# Patient Record
Sex: MALE | Race: WHITE | NOT HISPANIC OR LATINO | Employment: OTHER | URBAN - METROPOLITAN AREA
[De-identification: names, ages, dates, MRNs, and addresses within clinical notes are randomized per-mention and may not be internally consistent; named-entity substitution may affect disease eponyms.]

---

## 2023-11-02 ENCOUNTER — LAB (OUTPATIENT)
Dept: LAB | Facility: LAB | Age: 68
End: 2023-11-02
Payer: MEDICARE

## 2023-11-02 DIAGNOSIS — E78.5 HYPERLIPIDEMIA, UNSPECIFIED HYPERLIPIDEMIA TYPE: ICD-10-CM

## 2023-11-02 DIAGNOSIS — R35.1 NOCTURIA: ICD-10-CM

## 2023-11-02 DIAGNOSIS — M10.9 GOUT, UNSPECIFIED CAUSE, UNSPECIFIED CHRONICITY, UNSPECIFIED SITE: ICD-10-CM

## 2023-11-02 DIAGNOSIS — I10 HTN (HYPERTENSION), BENIGN: ICD-10-CM

## 2023-11-02 DIAGNOSIS — I10 HTN (HYPERTENSION), BENIGN: Primary | ICD-10-CM

## 2023-11-02 PROCEDURE — 36415 COLL VENOUS BLD VENIPUNCTURE: CPT

## 2023-11-02 PROCEDURE — 80061 LIPID PANEL: CPT

## 2023-11-02 PROCEDURE — 80053 COMPREHEN METABOLIC PANEL: CPT

## 2023-11-02 PROCEDURE — 84153 ASSAY OF PSA TOTAL: CPT

## 2023-11-02 PROCEDURE — 84550 ASSAY OF BLOOD/URIC ACID: CPT

## 2023-11-02 RX ORDER — CLOPIDOGREL BISULFATE 75 MG/1
75 TABLET ORAL DAILY
COMMUNITY
End: 2024-05-21 | Stop reason: SDUPTHER

## 2023-11-02 RX ORDER — ATENOLOL 25 MG/1
1 TABLET ORAL DAILY
COMMUNITY
Start: 2021-02-10 | End: 2023-11-13 | Stop reason: SDUPTHER

## 2023-11-02 RX ORDER — ATORVASTATIN CALCIUM 40 MG/1
40 TABLET, FILM COATED ORAL DAILY
Qty: 30 TABLET | Refills: 11 | Status: SHIPPED | OUTPATIENT
Start: 2023-11-02 | End: 2024-11-01

## 2023-11-02 RX ORDER — ATORVASTATIN CALCIUM 40 MG/1
40 TABLET, FILM COATED ORAL DAILY
COMMUNITY
End: 2023-11-02 | Stop reason: SDUPTHER

## 2023-11-02 RX ORDER — HYDROCODONE BITARTRATE AND ACETAMINOPHEN 5; 300 MG/1; MG/1
1 TABLET ORAL
COMMUNITY
Start: 2022-11-10 | End: 2023-11-13 | Stop reason: SDUPTHER

## 2023-11-02 RX ORDER — ALLOPURINOL 300 MG/1
300 TABLET ORAL
COMMUNITY
Start: 2020-12-11 | End: 2023-11-13 | Stop reason: SDUPTHER

## 2023-11-03 LAB
ALBUMIN SERPL BCP-MCNC: 4.6 G/DL (ref 3.4–5)
ALP SERPL-CCNC: 68 U/L (ref 33–136)
ALT SERPL W P-5'-P-CCNC: 23 U/L (ref 10–52)
ANION GAP SERPL CALC-SCNC: 14 MMOL/L (ref 10–20)
AST SERPL W P-5'-P-CCNC: 20 U/L (ref 9–39)
BILIRUB SERPL-MCNC: 0.8 MG/DL (ref 0–1.2)
BUN SERPL-MCNC: 18 MG/DL (ref 6–23)
CALCIUM SERPL-MCNC: 9.4 MG/DL (ref 8.6–10.6)
CHLORIDE SERPL-SCNC: 109 MMOL/L (ref 98–107)
CHOLEST SERPL-MCNC: 132 MG/DL (ref 0–199)
CHOLESTEROL/HDL RATIO: 2.6
CO2 SERPL-SCNC: 24 MMOL/L (ref 21–32)
CREAT SERPL-MCNC: 0.78 MG/DL (ref 0.5–1.3)
GFR SERPL CREATININE-BSD FRML MDRD: >90 ML/MIN/1.73M*2
GLUCOSE SERPL-MCNC: 97 MG/DL (ref 74–99)
HDLC SERPL-MCNC: 50.1 MG/DL
LDLC SERPL CALC-MCNC: 60 MG/DL
NON HDL CHOLESTEROL: 82 MG/DL (ref 0–149)
POTASSIUM SERPL-SCNC: 4 MMOL/L (ref 3.5–5.3)
PROT SERPL-MCNC: 7.2 G/DL (ref 6.4–8.2)
PSA SERPL-MCNC: 0.61 NG/ML
SODIUM SERPL-SCNC: 143 MMOL/L (ref 136–145)
TRIGL SERPL-MCNC: 110 MG/DL (ref 0–149)
URATE SERPL-MCNC: 5.1 MG/DL (ref 4–7.5)
VLDL: 22 MG/DL (ref 0–40)

## 2023-11-07 PROBLEM — I71.20 THORACIC AORTIC ANEURYSM WITHOUT RUPTURE (CMS-HCC): Chronic | Status: ACTIVE | Noted: 2023-06-29

## 2023-11-07 PROBLEM — K21.9 GERD (GASTROESOPHAGEAL REFLUX DISEASE): Status: ACTIVE | Noted: 2023-11-07

## 2023-11-07 PROBLEM — E78.00 HYPERCHOLESTEROLEMIA: Status: ACTIVE | Noted: 2023-11-07

## 2023-11-07 PROBLEM — I10 BENIGN ESSENTIAL HTN: Status: ACTIVE | Noted: 2023-11-07

## 2023-11-07 PROBLEM — K40.90 RIGHT INGUINAL HERNIA: Status: ACTIVE | Noted: 2023-11-07

## 2023-11-07 PROBLEM — I25.10 CAD (CORONARY ARTERY DISEASE): Chronic | Status: ACTIVE | Noted: 2023-11-07

## 2023-11-07 PROBLEM — I25.10 CAD (CORONARY ARTERY DISEASE): Status: ACTIVE | Noted: 2023-11-07

## 2023-11-07 PROBLEM — I71.20 THORACIC AORTIC ANEURYSM WITHOUT RUPTURE (CMS-HCC): Status: ACTIVE | Noted: 2023-06-29

## 2023-11-07 PROBLEM — E66.9 OBESITY (BMI 30-39.9): Status: ACTIVE | Noted: 2023-11-07

## 2023-11-07 PROBLEM — R73.09 ELEVATED GLUCOSE: Status: ACTIVE | Noted: 2023-11-07

## 2023-11-07 PROBLEM — E78.00 HYPERCHOLESTEROLEMIA: Chronic | Status: ACTIVE | Noted: 2023-11-07

## 2023-11-07 PROBLEM — M19.90 ARTHRITIS: Status: ACTIVE | Noted: 2023-11-07

## 2023-11-08 PROBLEM — K40.90 RIGHT INGUINAL HERNIA: Status: RESOLVED | Noted: 2023-11-07 | Resolved: 2023-11-08

## 2023-11-08 PROBLEM — R73.09 ELEVATED GLUCOSE: Status: RESOLVED | Noted: 2023-11-07 | Resolved: 2023-11-08

## 2023-11-08 PROBLEM — M19.90 ARTHRITIS: Status: RESOLVED | Noted: 2023-11-07 | Resolved: 2023-11-08

## 2023-11-08 PROBLEM — I10 BENIGN ESSENTIAL HTN: Chronic | Status: ACTIVE | Noted: 2023-11-07

## 2023-11-09 ENCOUNTER — OFFICE VISIT (OUTPATIENT)
Dept: CARDIOLOGY | Facility: CLINIC | Age: 68
End: 2023-11-09
Payer: MEDICARE

## 2023-11-09 VITALS
OXYGEN SATURATION: 98 % | HEIGHT: 73 IN | WEIGHT: 224 LBS | BODY MASS INDEX: 29.69 KG/M2 | DIASTOLIC BLOOD PRESSURE: 73 MMHG | SYSTOLIC BLOOD PRESSURE: 116 MMHG

## 2023-11-09 DIAGNOSIS — I71.21 ANEURYSM OF ASCENDING AORTA WITHOUT RUPTURE (CMS-HCC): Chronic | ICD-10-CM

## 2023-11-09 DIAGNOSIS — I10 BENIGN ESSENTIAL HTN: Primary | Chronic | ICD-10-CM

## 2023-11-09 DIAGNOSIS — I25.10 CORONARY ARTERY DISEASE INVOLVING NATIVE CORONARY ARTERY OF NATIVE HEART WITHOUT ANGINA PECTORIS: Chronic | ICD-10-CM

## 2023-11-09 DIAGNOSIS — G47.33 OBSTRUCTIVE SLEEP APNEA (ADULT) (PEDIATRIC): Chronic | ICD-10-CM

## 2023-11-09 DIAGNOSIS — E78.00 HYPERCHOLESTEROLEMIA: Chronic | ICD-10-CM

## 2023-11-09 PROBLEM — Z87.19 S/P LAPAROSCOPIC HERNIA REPAIR: Status: ACTIVE | Noted: 2023-11-09

## 2023-11-09 PROBLEM — Z98.890 S/P LAPAROSCOPIC HERNIA REPAIR: Status: ACTIVE | Noted: 2023-11-09

## 2023-11-09 PROCEDURE — 99214 OFFICE O/P EST MOD 30 MIN: CPT | Performed by: INTERNAL MEDICINE

## 2023-11-09 PROCEDURE — 3078F DIAST BP <80 MM HG: CPT | Performed by: INTERNAL MEDICINE

## 2023-11-09 PROCEDURE — 1160F RVW MEDS BY RX/DR IN RCRD: CPT | Performed by: INTERNAL MEDICINE

## 2023-11-09 PROCEDURE — 3074F SYST BP LT 130 MM HG: CPT | Performed by: INTERNAL MEDICINE

## 2023-11-09 PROCEDURE — 93005 ELECTROCARDIOGRAM TRACING: CPT | Mod: PO | Performed by: INTERNAL MEDICINE

## 2023-11-09 PROCEDURE — 1159F MED LIST DOCD IN RCRD: CPT | Performed by: INTERNAL MEDICINE

## 2023-11-09 PROCEDURE — 1036F TOBACCO NON-USER: CPT | Performed by: INTERNAL MEDICINE

## 2023-11-09 PROCEDURE — 99214 OFFICE O/P EST MOD 30 MIN: CPT | Mod: PO | Performed by: INTERNAL MEDICINE

## 2023-11-09 RX ORDER — LANOLIN ALCOHOL/MO/W.PET/CERES
500 CREAM (GRAM) TOPICAL DAILY
COMMUNITY

## 2023-11-09 RX ORDER — ESOMEPRAZOLE MAGNESIUM 40 MG/1
40 CAPSULE, DELAYED RELEASE ORAL
COMMUNITY

## 2023-11-09 RX ORDER — CHOLECALCIFEROL (VITAMIN D3) 25 MCG
1000 TABLET ORAL DAILY
COMMUNITY

## 2023-11-09 NOTE — PATIENT INSTRUCTIONS
1. CAD. Low risk stress test inferoposterior wall ischemia. Catheterization revealed an 80% circumflex marginal branch which was successfully stented. He had a sequential 80% lesion in the RCA and PDA which was staged. He has been doing great.  Continue with aspirin daily.  No symptoms of angina.  EKG today.    2. Hyperlipidemia. Baseline . On atorvastatin 40 mg his 11/2/23 LDL 60, HDL 50 Trig 110.  He believes he can get these results back down to an LDL of less than 50.     3. Thoracic aortic aneurysm. 4.1 cm on a formal CTA. A repeat CTA chest will be done sometime in November 2023.  This has not yet been done.  Will be done and they will call 1 week later to review the results    4. Hypertension well-controlled     EKG today.  CTA chest sometime in the next couple of months.  They will call 1 week afterwards to review the results.  He will return to see me 6 months.

## 2023-11-13 ENCOUNTER — ANCILLARY PROCEDURE (OUTPATIENT)
Dept: RADIOLOGY | Facility: CLINIC | Age: 68
End: 2023-11-13
Payer: MEDICARE

## 2023-11-13 ENCOUNTER — OFFICE VISIT (OUTPATIENT)
Dept: PRIMARY CARE | Facility: CLINIC | Age: 68
End: 2023-11-13
Payer: MEDICARE

## 2023-11-13 VITALS
BODY MASS INDEX: 30.22 KG/M2 | HEIGHT: 73 IN | OXYGEN SATURATION: 96 % | HEART RATE: 49 BPM | WEIGHT: 228 LBS | DIASTOLIC BLOOD PRESSURE: 85 MMHG | SYSTOLIC BLOOD PRESSURE: 135 MMHG

## 2023-11-13 DIAGNOSIS — I10 BENIGN ESSENTIAL HTN: Primary | Chronic | ICD-10-CM

## 2023-11-13 DIAGNOSIS — I25.10 CORONARY ARTERY DISEASE INVOLVING NATIVE CORONARY ARTERY OF NATIVE HEART WITHOUT ANGINA PECTORIS: Chronic | ICD-10-CM

## 2023-11-13 DIAGNOSIS — Z12.11 ENCOUNTER FOR SCREENING FOR MALIGNANT NEOPLASM OF COLON: ICD-10-CM

## 2023-11-13 DIAGNOSIS — M54.50 ACUTE BILATERAL LOW BACK PAIN WITHOUT SCIATICA: ICD-10-CM

## 2023-11-13 DIAGNOSIS — I71.20 THORACIC AORTIC ANEURYSM, WITHOUT RUPTURE, UNSPECIFIED (CMS-HCC): ICD-10-CM

## 2023-11-13 PROCEDURE — 3079F DIAST BP 80-89 MM HG: CPT | Performed by: FAMILY MEDICINE

## 2023-11-13 PROCEDURE — 99213 OFFICE O/P EST LOW 20 MIN: CPT | Performed by: FAMILY MEDICINE

## 2023-11-13 PROCEDURE — 1170F FXNL STATUS ASSESSED: CPT | Performed by: FAMILY MEDICINE

## 2023-11-13 PROCEDURE — 1159F MED LIST DOCD IN RCRD: CPT | Performed by: FAMILY MEDICINE

## 2023-11-13 PROCEDURE — G0444 DEPRESSION SCREEN ANNUAL: HCPCS | Performed by: FAMILY MEDICINE

## 2023-11-13 PROCEDURE — 1036F TOBACCO NON-USER: CPT | Performed by: FAMILY MEDICINE

## 2023-11-13 PROCEDURE — 71275 CT ANGIOGRAPHY CHEST: CPT | Performed by: INTERNAL MEDICINE

## 2023-11-13 PROCEDURE — 3075F SYST BP GE 130 - 139MM HG: CPT | Performed by: FAMILY MEDICINE

## 2023-11-13 PROCEDURE — 1160F RVW MEDS BY RX/DR IN RCRD: CPT | Performed by: FAMILY MEDICINE

## 2023-11-13 PROCEDURE — 71275 CT ANGIOGRAPHY CHEST: CPT

## 2023-11-13 PROCEDURE — G0439 PPPS, SUBSEQ VISIT: HCPCS | Performed by: FAMILY MEDICINE

## 2023-11-13 PROCEDURE — 2550000001 HC RX 255 CONTRASTS: Performed by: INTERNAL MEDICINE

## 2023-11-13 RX ORDER — ATENOLOL 25 MG/1
25 TABLET ORAL DAILY
Qty: 90 TABLET | Refills: 2 | Status: SHIPPED | OUTPATIENT
Start: 2023-11-13

## 2023-11-13 RX ORDER — ALLOPURINOL 300 MG/1
300 TABLET ORAL
Qty: 90 TABLET | Refills: 2 | Status: SHIPPED | OUTPATIENT
Start: 2023-11-13

## 2023-11-13 RX ORDER — HYDROCODONE BITARTRATE AND ACETAMINOPHEN 5; 300 MG/1; MG/1
1 TABLET ORAL EVERY 6 HOURS PRN
Qty: 30 TABLET | Refills: 0 | Status: SHIPPED | OUTPATIENT
Start: 2023-11-13

## 2023-11-13 RX ADMIN — IOHEXOL 75 ML: 350 INJECTION, SOLUTION INTRAVENOUS at 12:14

## 2023-11-13 ASSESSMENT — ENCOUNTER SYMPTOMS
CONSTITUTIONAL NEGATIVE: 1
CARDIOVASCULAR NEGATIVE: 1
RESPIRATORY NEGATIVE: 1
NEUROLOGICAL NEGATIVE: 1
MUSCULOSKELETAL NEGATIVE: 1
GASTROINTESTINAL NEGATIVE: 1

## 2023-11-13 ASSESSMENT — ACTIVITIES OF DAILY LIVING (ADL)
GROCERY_SHOPPING: INDEPENDENT
TAKING_MEDICATION: INDEPENDENT
BATHING: INDEPENDENT
DOING_HOUSEWORK: INDEPENDENT
DRESSING: INDEPENDENT
MANAGING_FINANCES: INDEPENDENT

## 2023-11-13 ASSESSMENT — PATIENT HEALTH QUESTIONNAIRE - PHQ9
SUM OF ALL RESPONSES TO PHQ9 QUESTIONS 1 AND 2: 0
1. LITTLE INTEREST OR PLEASURE IN DOING THINGS: NOT AT ALL
2. FEELING DOWN, DEPRESSED OR HOPELESS: NOT AT ALL

## 2023-11-13 NOTE — PROGRESS NOTES
Subjective   Patient ID: Devante Denise is a 68 y.o. male who presents for Medicare Annual Wellness Visit Subsequent.  HPI  Patient with a history of hypertension and coronary artery disease.  Review of Systems   Constitutional: Negative.    HENT: Negative.     Respiratory: Negative.     Cardiovascular: Negative.    Gastrointestinal: Negative.    Genitourinary: Negative.    Musculoskeletal: Negative.    Neurological: Negative.        Objective   Physical Exam  Constitutional:       Appearance: Normal appearance.   HENT:      Head: Normocephalic and atraumatic.      Nose: Nose normal.      Mouth/Throat:      Mouth: Mucous membranes are moist.   Eyes:      Extraocular Movements: Extraocular movements intact.      Pupils: Pupils are equal, round, and reactive to light.   Cardiovascular:      Rate and Rhythm: Normal rate and regular rhythm.   Pulmonary:      Effort: Pulmonary effort is normal.      Breath sounds: Normal breath sounds.   Musculoskeletal:         General: Normal range of motion.      Cervical back: Normal range of motion.   Skin:     General: Skin is warm and dry.   Neurological:      Mental Status: He is alert.   Rectal exam shows slightly enlarged prostate    Assessment/Plan

## 2023-11-20 LAB
ATRIAL RATE: 47 BPM
P AXIS: 57 DEGREES
P OFFSET: 204 MS
P ONSET: 142 MS
PR INTERVAL: 160 MS
Q ONSET: 222 MS
QRS COUNT: 8 BEATS
QRS DURATION: 96 MS
QT INTERVAL: 456 MS
QTC CALCULATION(BAZETT): 403 MS
QTC FREDERICIA: 420 MS
R AXIS: 56 DEGREES
T AXIS: 56 DEGREES
T OFFSET: 450 MS
VENTRICULAR RATE: 47 BPM

## 2023-11-27 ENCOUNTER — TELEPHONE (OUTPATIENT)
Dept: CARDIOLOGY | Facility: CLINIC | Age: 68
End: 2023-11-27
Payer: MEDICARE

## 2024-05-09 ENCOUNTER — APPOINTMENT (OUTPATIENT)
Dept: CARDIOLOGY | Facility: CLINIC | Age: 69
End: 2024-05-09
Payer: MEDICARE

## 2024-05-09 NOTE — PROGRESS NOTES
Referred by No ref. provider found    HPI Feeling well.  Remains physically active without difficulty.    Past Medical History:  Problem List Items Addressed This Visit    None       Past Medical History:   Diagnosis Date    Benign essential HTN 11/07/2023    CAD (coronary artery disease) 11/07/2023    Elevated calcium score 1416 Agatston units  Cath 3/2022 LAD 50-60% neg FFR, CX 85% s/p TAHIR, RCA large 80% prox, diffuse prox to mid RCA stenosis    Hypercholesterolemia 11/07/2023    Dr. Mas will follow    Obstructive sleep apnea (adult) (pediatric) 10/06/2016    Formatting of this note might be different from the original. 10/5/16 PSG with an AHI of 36.6, obstructive events and associated with occasional oxyhemoglobin desaturations  CPAP 8 cm H2O. Respironics Ana View full face mask (medium size) #  BMI 35.5 kg/m2; ESS 2 out of 24    Other specified health status     No pertinent past surgical history    Personal history of other diseases of the musculoskeletal system and connective tissue     History of gout    Thoracic aortic aneurysm without rupture (CMS-[facility]) 06/29/2023    4.1 cm on a calcium scoring CT scan             Past Surgical History:  He has a past surgical history that includes Other surgical history (11/09/2021); Other surgical history (06/20/2022); Other surgical history (03/29/2022); and Other surgical history (03/29/2022).      Social History:  He reports that he has never smoked. He has never used smokeless tobacco. No history on file for alcohol use and drug use.    Family History:  Family History   Problem Relation Name Age of Onset    Cancer Mother      Coronary artery disease Father      Heart attack Father      Cancer Father          Allergies:  Patient has no known allergies.    Outpatient Medications:  Current Outpatient Medications   Medication Instructions    allopurinol (ZYLOPRIM) 300 mg, oral, Daily RT    ascorbic acid (VITAMIN C) 500 mg, oral, Daily    atenolol (TENORMIN) 25 mg,  "oral, Daily    atorvastatin (LIPITOR) 40 mg, oral, Daily    cholecalciferol (VITAMIN D3) 1,000 Units, oral, Daily    clopidogrel (PLAVIX) 75 mg, oral, Daily    esomeprazole (NEXIUM) 40 mg, oral, Do not open capsule.    HYDROcodone-acetaminophen (Vicodin) 5-300 mg tablet 1 tablet, oral, Every 6 hours PRN        Last Recorded Vitals:  There were no vitals filed for this visit.    Physical Exam    Physical  Patient is alert and oriented x3.  HEENT is unremarkable mucous members are moist  Neck no JVP no bruits upstrokes are full no thyromegaly  Lungs are clear bilaterally.  No wheezing crackles or rales  Heart regular rhythm normal S1-S2 there is no S3 no murmurs are heard.  Abdomen is soft vessels are positive nontender nondistended no organomegaly no pulsatile masses  Extremities have no edema.  Distal pulses present palpable.  Neuro is grossly nonfocal  Skin has no rashes     Last Labs:  CBC -  Lab Results   Component Value Date    WBC 6.1 05/09/2022    HGB 14.8 05/09/2022    HCT 43.8 05/09/2022    MCV 94 05/09/2022     05/09/2022       CMP -  Lab Results   Component Value Date    CALCIUM 9.4 11/02/2023    PHOS 3.8 05/09/2022    PROT 7.2 11/02/2023    ALBUMIN 4.6 11/02/2023    AST 20 11/02/2023    ALT 23 11/02/2023    ALKPHOS 68 11/02/2023    BILITOT 0.8 11/02/2023       LIPID PANEL -   Lab Results   Component Value Date    CHOL 132 11/02/2023    HDL 50.1 11/02/2023    CHHDL 2.6 11/02/2023    VLDL 22 11/02/2023    TRIG 110 11/02/2023    NHDL 82 11/02/2023       RENAL FUNCTION PANEL -   Lab Results   Component Value Date    K 4.0 11/02/2023    PHOS 3.8 05/09/2022       No results found for: \"BNP\", \"HGBA1C\"  Procedure    CTA chest 11/13/2023 TAA 4.2 cm coronary calcification    PCI [05/12/2022, Dr. Harmony Reid]: Successful IVUS guided PCI to RCA using two TAHIR     PCI [03/24/2022, Dr. Harmony Reid]: Non-obstructive LAD stenosis by hemodynamic evaluation. Successful IVUS guided TAHIR " placement to mid LCX.      Left Heart Cath [03/24/2022, Dr. Juaquin Mas]: Severe multi-vessel disease including an 85% AV groove circumflex lesion, 75% proximal RCA followed by moderate to severe diffuse disease in the entire mid to distal portion of the RCA, and moderate disease in the LAD.     CTA [02/25/2022]: Ascending thoracic aortic aneurysmal dilatation to 4.1 cm. Sig coronary artery calcifications. LLL solid pulm nodule measuring (4 mm).      EX NST [02/25/2022]: 8 min 5 sec (10.10 METs) . . . Prob normal â€“ some degree diaphragmatic att artifact. Subtle degree ischemia involving only basilar inferoposterior wall cannot be excluded but less likely in view of normal contractility. EF 56%.     CT / SCORING [01/24/2022] = 1415.8 (LM 98.5, LAD 60, LCx 400.9, .4). 5mm posterolateral L lung nodule, likely benign. Aneurysmal dilatation ascending thoracic aorta @ 4.6 cm.         Assessment/Plan   1. CAD. Low risk stress test inferoposterior wall ischemia. Catheterization revealed an 80% circumflex marginal branch which was successfully stented. He had a sequential 80% lesion in the RCA and PDA which was staged. He has been doing great.  Continue with aspirin daily.  No symptoms of angina.  EKG today.    2. Hyperlipidemia. Baseline . On atorvastatin 40 mg his 11/2/23 LDL 60, HDL 50 Trig 110.  He believes he can get these results back down to an LDL of less than 50.     3. Thoracic aortic aneurysm. 4.1 cm on a formal CTA.  11/13/2023 4.2 cm  4. Hypertension well-controlled       Juaquin Mas MD     Instructions and follow up

## 2024-05-21 DIAGNOSIS — I10 BENIGN ESSENTIAL HTN: Primary | Chronic | ICD-10-CM

## 2024-05-21 RX ORDER — CLOPIDOGREL BISULFATE 75 MG/1
75 TABLET ORAL DAILY
Qty: 90 TABLET | Refills: 3 | Status: SHIPPED | OUTPATIENT
Start: 2024-05-21

## 2024-08-11 DIAGNOSIS — I10 BENIGN ESSENTIAL HTN: Chronic | ICD-10-CM

## 2024-08-13 RX ORDER — ATENOLOL 25 MG/1
25 TABLET ORAL DAILY
Qty: 90 TABLET | Refills: 2 | Status: SHIPPED | OUTPATIENT
Start: 2024-08-13

## 2024-08-13 RX ORDER — ALLOPURINOL 300 MG/1
300 TABLET ORAL DAILY
Qty: 90 TABLET | Refills: 2 | Status: SHIPPED | OUTPATIENT
Start: 2024-08-13

## 2024-09-05 ENCOUNTER — TELEPHONE (OUTPATIENT)
Dept: CARDIOLOGY | Facility: CLINIC | Age: 69
End: 2024-09-05
Payer: MEDICARE

## 2024-09-05 DIAGNOSIS — I25.10 CORONARY ARTERY DISEASE INVOLVING NATIVE CORONARY ARTERY OF NATIVE HEART WITHOUT ANGINA PECTORIS: ICD-10-CM

## 2024-09-05 NOTE — TELEPHONE ENCOUNTER
Pt's spouse asking if pt can get blood work drawn prior to appt 9/13? Last lipid/CMP drawn 11/2/23.

## 2024-09-10 ENCOUNTER — LAB (OUTPATIENT)
Dept: LAB | Facility: LAB | Age: 69
End: 2024-09-10
Payer: MEDICARE

## 2024-09-10 DIAGNOSIS — I25.10 CORONARY ARTERY DISEASE INVOLVING NATIVE CORONARY ARTERY OF NATIVE HEART WITHOUT ANGINA PECTORIS: ICD-10-CM

## 2024-09-10 LAB
ALBUMIN SERPL BCP-MCNC: 5.1 G/DL (ref 3.4–5)
ALP SERPL-CCNC: 63 U/L (ref 33–136)
ALT SERPL W P-5'-P-CCNC: 25 U/L (ref 10–52)
ANION GAP SERPL CALC-SCNC: 16 MMOL/L (ref 10–20)
AST SERPL W P-5'-P-CCNC: 24 U/L (ref 9–39)
BILIRUB SERPL-MCNC: 1.1 MG/DL (ref 0–1.2)
BUN SERPL-MCNC: 18 MG/DL (ref 6–23)
CALCIUM SERPL-MCNC: 10 MG/DL (ref 8.6–10.6)
CHLORIDE SERPL-SCNC: 104 MMOL/L (ref 98–107)
CHOLEST SERPL-MCNC: 141 MG/DL (ref 0–199)
CHOLESTEROL/HDL RATIO: 2.6
CO2 SERPL-SCNC: 24 MMOL/L (ref 21–32)
CREAT SERPL-MCNC: 0.9 MG/DL (ref 0.5–1.3)
EGFRCR SERPLBLD CKD-EPI 2021: >90 ML/MIN/1.73M*2
GLUCOSE SERPL-MCNC: 104 MG/DL (ref 74–99)
HDLC SERPL-MCNC: 54.4 MG/DL
LDLC SERPL CALC-MCNC: 63 MG/DL
NON HDL CHOLESTEROL: 87 MG/DL (ref 0–149)
POTASSIUM SERPL-SCNC: 3.9 MMOL/L (ref 3.5–5.3)
PROT SERPL-MCNC: 7.5 G/DL (ref 6.4–8.2)
SODIUM SERPL-SCNC: 140 MMOL/L (ref 136–145)
TRIGL SERPL-MCNC: 117 MG/DL (ref 0–149)
VLDL: 23 MG/DL (ref 0–40)

## 2024-09-10 PROCEDURE — 80053 COMPREHEN METABOLIC PANEL: CPT

## 2024-09-10 PROCEDURE — 36415 COLL VENOUS BLD VENIPUNCTURE: CPT

## 2024-09-10 PROCEDURE — 80061 LIPID PANEL: CPT

## 2024-09-13 ENCOUNTER — OFFICE VISIT (OUTPATIENT)
Dept: CARDIOLOGY | Facility: CLINIC | Age: 69
End: 2024-09-13
Payer: MEDICARE

## 2024-09-13 VITALS
BODY MASS INDEX: 29.63 KG/M2 | HEART RATE: 58 BPM | WEIGHT: 223.6 LBS | HEIGHT: 73 IN | OXYGEN SATURATION: 95 % | SYSTOLIC BLOOD PRESSURE: 128 MMHG | DIASTOLIC BLOOD PRESSURE: 82 MMHG

## 2024-09-13 DIAGNOSIS — I10 BENIGN ESSENTIAL HTN: Chronic | ICD-10-CM

## 2024-09-13 DIAGNOSIS — E78.00 HYPERCHOLESTEROLEMIA: Chronic | ICD-10-CM

## 2024-09-13 DIAGNOSIS — I10 BENIGN ESSENTIAL HYPERTENSION: Primary | Chronic | ICD-10-CM

## 2024-09-13 DIAGNOSIS — I25.10 CORONARY ARTERY DISEASE INVOLVING NATIVE CORONARY ARTERY OF NATIVE HEART WITHOUT ANGINA PECTORIS: Chronic | ICD-10-CM

## 2024-09-13 DIAGNOSIS — E78.5 HYPERLIPIDEMIA, UNSPECIFIED HYPERLIPIDEMIA TYPE: ICD-10-CM

## 2024-09-13 PROCEDURE — 1036F TOBACCO NON-USER: CPT | Performed by: INTERNAL MEDICINE

## 2024-09-13 PROCEDURE — 3079F DIAST BP 80-89 MM HG: CPT | Performed by: INTERNAL MEDICINE

## 2024-09-13 PROCEDURE — 3074F SYST BP LT 130 MM HG: CPT | Performed by: INTERNAL MEDICINE

## 2024-09-13 PROCEDURE — 3008F BODY MASS INDEX DOCD: CPT | Performed by: INTERNAL MEDICINE

## 2024-09-13 PROCEDURE — 93005 ELECTROCARDIOGRAM TRACING: CPT | Performed by: INTERNAL MEDICINE

## 2024-09-13 PROCEDURE — 1160F RVW MEDS BY RX/DR IN RCRD: CPT | Performed by: INTERNAL MEDICINE

## 2024-09-13 PROCEDURE — 99214 OFFICE O/P EST MOD 30 MIN: CPT | Performed by: INTERNAL MEDICINE

## 2024-09-13 PROCEDURE — 1159F MED LIST DOCD IN RCRD: CPT | Performed by: INTERNAL MEDICINE

## 2024-09-13 RX ORDER — ATORVASTATIN CALCIUM 40 MG/1
40 TABLET, FILM COATED ORAL DAILY
Qty: 90 TABLET | Refills: 3 | Status: SHIPPED | OUTPATIENT
Start: 2024-09-13 | End: 2025-09-13

## 2024-09-13 RX ORDER — CLOPIDOGREL BISULFATE 75 MG/1
75 TABLET ORAL DAILY
Qty: 90 TABLET | Refills: 3 | Status: SHIPPED | OUTPATIENT
Start: 2024-09-13

## 2024-09-13 RX ORDER — ATENOLOL 25 MG/1
25 TABLET ORAL DAILY
Qty: 90 TABLET | Refills: 3 | Status: SHIPPED | OUTPATIENT
Start: 2024-09-13

## 2024-09-13 ASSESSMENT — ENCOUNTER SYMPTOMS
DEPRESSION: 0
OCCASIONAL FEELINGS OF UNSTEADINESS: 0
LOSS OF SENSATION IN FEET: 0

## 2024-09-13 ASSESSMENT — PATIENT HEALTH QUESTIONNAIRE - PHQ9
1. LITTLE INTEREST OR PLEASURE IN DOING THINGS: NOT AT ALL
2. FEELING DOWN, DEPRESSED OR HOPELESS: NOT AT ALL
SUM OF ALL RESPONSES TO PHQ9 QUESTIONS 1 AND 2: 0

## 2024-09-13 ASSESSMENT — COLUMBIA-SUICIDE SEVERITY RATING SCALE - C-SSRS
2. HAVE YOU ACTUALLY HAD ANY THOUGHTS OF KILLING YOURSELF?: NO
1. IN THE PAST MONTH, HAVE YOU WISHED YOU WERE DEAD OR WISHED YOU COULD GO TO SLEEP AND NOT WAKE UP?: NO
6. HAVE YOU EVER DONE ANYTHING, STARTED TO DO ANYTHING, OR PREPARED TO DO ANYTHING TO END YOUR LIFE?: NO

## 2024-09-13 NOTE — PATIENT INSTRUCTIONS
1. CAD. Low risk stress test inferoposterior wall ischemia. Catheterization revealed an 80% circumflex marginal branch which was successfully stented. He had a sequential 80% lesion in the RCA and PDA which was staged. He has been doing great.  Continue with aspirin daily.  No symptoms of angina.  EKG today.    2. Hyperlipidemia. Baseline . On atorvastatin 40 mg.  9/10/2024 LDL 63 HDL 54 triglycerides 117 LFTs normal blood sugar 104     3. Thoracic aortic aneurysm. 4.1 cm on a formal CTA 2022..  The CT was repeated 11/13/2023 the aneurysm stable at 4.2 cm. Repeat in 9/2025.     4. Hypertension well-controlled.     CTA 9/2025, FBW 9/2025, RTC 1 year.  EKG

## 2024-09-13 NOTE — PROGRESS NOTES
Referred by No ref. provider found    HPI Feeling well.  Remains physically active without difficulty. No CP/SOB    Past Medical History:  Problem List Items Addressed This Visit    None     Past Medical History:   Diagnosis Date    Benign essential HTN 11/07/2023    CAD (coronary artery disease) 11/07/2023    Elevated calcium score 1416 Agatston units  Cath 3/2022 LAD 50-60% neg FFR, CX 85% s/p TAHIR, RCA large 80% prox, diffuse prox to mid RCA stenosis    Hypercholesterolemia 11/07/2023    Dr. Mas will follow    Obstructive sleep apnea (adult) (pediatric) 10/06/2016    Formatting of this note might be different from the original. 10/5/16 PSG with an AHI of 36.6, obstructive events and associated with occasional oxyhemoglobin desaturations  CPAP 8 cm H2O. [employer] Ana View full face mask (medium size) #  BMI 35.5 kg/m2; ESS 2 out of 24    Other specified health status     No pertinent past surgical history    Personal history of other diseases of the musculoskeletal system and connective tissue     History of gout    Thoracic aortic aneurysm without rupture (CMS-[facility]) 06/29/2023    4.1 cm on a calcium scoring CT scan      Past Surgical History:  He has a past surgical history that includes Other surgical history (11/09/2021); Other surgical history (06/20/2022); Other surgical history (03/29/2022); and Other surgical history (03/29/2022).      Social History:  He reports that he has never smoked. He has never used smokeless tobacco. No history on file for alcohol use and drug use.    Family History:  Family History   Problem Relation Name Age of Onset    Cancer Mother      Coronary artery disease Father      Heart attack Father      Cancer Father       Allergies:  Patient has no known allergies.    Outpatient Medications:  Current Outpatient Medications   Medication Instructions    allopurinol (ZYLOPRIM) 300 mg, oral, Daily    ascorbic acid (VITAMIN C) 500 mg, oral, Daily    atenolol (TENORMIN) 25 mg,  "oral, Daily    atorvastatin (LIPITOR) 40 mg, oral, Daily    cholecalciferol (VITAMIN D3) 1,000 Units, oral, Daily    clopidogrel (PLAVIX) 75 mg, oral, Daily    esomeprazole (NEXIUM) 40 mg, oral, Do not open capsule.    HYDROcodone-acetaminophen (Vicodin) 5-300 mg tablet 1 tablet, oral, Every 6 hours PRN     Last Recorded Vitals:  There were no vitals filed for this visit.    Physical Exam  Patient is alert and oriented x3.  HEENT is unremarkable mucous members are moist  Neck no JVP no bruits upstrokes are full no thyromegaly  Lungs are clear bilaterally.  No wheezing crackles or rales  Heart regular rhythm normal S1-S2 there is no S3 no murmurs are heard.  Abdomen is soft bs are positive nontender nondistended no organomegaly no pulsatile masses  Extremities have no edema.  Distal pulses present palpable.  Neuro is grossly nonfocal  Skin has no rashes     Last Labs:  CBC -  Lab Results   Component Value Date    WBC 6.1 05/09/2022    HGB 14.8 05/09/2022    HCT 43.8 05/09/2022    MCV 94 05/09/2022     05/09/2022     CMP -  Lab Results   Component Value Date    CALCIUM 10.0 09/10/2024    PHOS 3.8 05/09/2022    PROT 7.5 09/10/2024    ALBUMIN 5.1 (H) 09/10/2024    AST 24 09/10/2024    ALT 25 09/10/2024    ALKPHOS 63 09/10/2024    BILITOT 1.1 09/10/2024     LIPID PANEL -   Lab Results   Component Value Date    CHOL 141 09/10/2024    HDL 54.4 09/10/2024    CHHDL 2.6 09/10/2024    VLDL 23 09/10/2024    TRIG 117 09/10/2024    NHDL 87 09/10/2024     RENAL FUNCTION PANEL -   Lab Results   Component Value Date    K 3.9 09/10/2024    PHOS 3.8 05/09/2022     No results found for: \"BNP\", \"HGBA1C\"  Procedure    CTA chest 11/13/2023 stable TAA 4.2 cm, diffuse coronary calcification    PCI [05/12/2022, Dr. Harmony Reid]: Successful IVUS guided PCI to RCA using two TAHIR     PCI [03/24/2022, Dr. Harmony Reid]: Non-obstructive LAD stenosis by hemodynamic evaluation. Successful IVUS guided TAHIR placement to " mid LCX.      Left Heart Cath [03/24/2022, Dr. Juaquin Mas]: Severe multi-vessel disease including an 85% AV groove circumflex lesion, 75% proximal RCA followed by moderate to severe diffuse disease in the entire mid to distal portion of the RCA, and moderate disease in the LAD.     CTA [02/25/2022]: Ascending thoracic aortic aneurysmal dilatation to 4.1 cm. Sig coronary artery calcifications. LLL solid pulm nodule measuring (4 mm).      EX NST [02/25/2022]: 8 min 5 sec (10.10 METs) . . . Prob normal â€“ some degree diaphragmatic att artifact. Subtle degree ischemia involving only basilar inferoposterior wall cannot be excluded but less likely in view of normal contractility. EF 56%.     CT / SCORING [01/24/2022] = 1415.8 (LM 98.5, LAD 60, LCx 400.9, .4). 5mm posterolateral L lung nodule, likely benign. Aneurysmal dilatation ascending thoracic aorta @ 4.6 cm.       Assessment/Plan   1. CAD. Low risk stress test inferoposterior wall ischemia. Catheterization revealed an 80% circumflex marginal branch which was successfully stented. He had a sequential 80% lesion in the RCA and PDA which was staged. He has been doing great.  Continue with aspirin daily.  No symptoms of angina.  EKG today.    2. Hyperlipidemia. Baseline . On atorvastatin 40 mg.  9/10/2024 LDL 63 HDL 54 triglycerides 117 LFTs normal blood sugar 104     3. Thoracic aortic aneurysm. 4.1 cm on a formal CTA 2022..  The CT was repeated 11/13/2023 the aneurysm stable at 4.2 cm. Repeat in 9/2025.     4. Hypertension well-controlled.     CTA 9/2025, FBW 9/2025, RTC 1 year.  EKG    Juaquin Mas MD     Instructions and follow up

## 2024-09-17 LAB
ATRIAL RATE: 46 BPM
P AXIS: 36 DEGREES
P OFFSET: 200 MS
P ONSET: 143 MS
PR INTERVAL: 158 MS
Q ONSET: 222 MS
QRS COUNT: 8 BEATS
QRS DURATION: 94 MS
QT INTERVAL: 432 MS
QTC CALCULATION(BAZETT): 378 MS
QTC FREDERICIA: 395 MS
R AXIS: 4 DEGREES
T AXIS: 24 DEGREES
T OFFSET: 438 MS
VENTRICULAR RATE: 46 BPM

## 2024-11-19 ENCOUNTER — APPOINTMENT (OUTPATIENT)
Dept: PRIMARY CARE | Facility: CLINIC | Age: 69
End: 2024-11-19
Payer: MEDICARE

## 2024-11-19 VITALS
HEIGHT: 73 IN | SYSTOLIC BLOOD PRESSURE: 125 MMHG | OXYGEN SATURATION: 96 % | WEIGHT: 226 LBS | BODY MASS INDEX: 29.95 KG/M2 | DIASTOLIC BLOOD PRESSURE: 80 MMHG | HEART RATE: 52 BPM

## 2024-11-19 DIAGNOSIS — M54.50 ACUTE BILATERAL LOW BACK PAIN WITHOUT SCIATICA: ICD-10-CM

## 2024-11-19 DIAGNOSIS — Z00.00 ROUTINE GENERAL MEDICAL EXAMINATION AT HEALTH CARE FACILITY: Primary | ICD-10-CM

## 2024-11-19 DIAGNOSIS — R35.1 NOCTURIA: ICD-10-CM

## 2024-11-19 PROCEDURE — G0439 PPPS, SUBSEQ VISIT: HCPCS | Performed by: FAMILY MEDICINE

## 2024-11-19 PROCEDURE — 3074F SYST BP LT 130 MM HG: CPT | Performed by: FAMILY MEDICINE

## 2024-11-19 PROCEDURE — 99213 OFFICE O/P EST LOW 20 MIN: CPT | Performed by: FAMILY MEDICINE

## 2024-11-19 PROCEDURE — 3008F BODY MASS INDEX DOCD: CPT | Performed by: FAMILY MEDICINE

## 2024-11-19 PROCEDURE — 3079F DIAST BP 80-89 MM HG: CPT | Performed by: FAMILY MEDICINE

## 2024-11-19 PROCEDURE — 99497 ADVNCD CARE PLAN 30 MIN: CPT | Performed by: FAMILY MEDICINE

## 2024-11-19 PROCEDURE — 1124F ACP DISCUSS-NO DSCNMKR DOCD: CPT | Performed by: FAMILY MEDICINE

## 2024-11-19 PROCEDURE — G0444 DEPRESSION SCREEN ANNUAL: HCPCS | Performed by: FAMILY MEDICINE

## 2024-11-19 PROCEDURE — 90677 PCV20 VACCINE IM: CPT | Performed by: FAMILY MEDICINE

## 2024-11-19 PROCEDURE — 1170F FXNL STATUS ASSESSED: CPT | Performed by: FAMILY MEDICINE

## 2024-11-19 PROCEDURE — G0009 ADMIN PNEUMOCOCCAL VACCINE: HCPCS | Performed by: FAMILY MEDICINE

## 2024-11-19 RX ORDER — HYDROCODONE BITARTRATE AND ACETAMINOPHEN 5; 300 MG/1; MG/1
1 TABLET ORAL EVERY 6 HOURS PRN
Qty: 30 TABLET | Refills: 0 | Status: SHIPPED | OUTPATIENT
Start: 2024-11-19

## 2024-11-19 ASSESSMENT — PATIENT HEALTH QUESTIONNAIRE - PHQ9
SUM OF ALL RESPONSES TO PHQ9 QUESTIONS 1 AND 2: 0
2. FEELING DOWN, DEPRESSED OR HOPELESS: NOT AT ALL
1. LITTLE INTEREST OR PLEASURE IN DOING THINGS: NOT AT ALL

## 2024-11-19 ASSESSMENT — ACTIVITIES OF DAILY LIVING (ADL)
DOING_HOUSEWORK: INDEPENDENT
DRESSING: INDEPENDENT
GROCERY_SHOPPING: INDEPENDENT
TAKING_MEDICATION: INDEPENDENT
BATHING: INDEPENDENT
MANAGING_FINANCES: INDEPENDENT

## 2024-11-19 ASSESSMENT — ENCOUNTER SYMPTOMS
RESPIRATORY NEGATIVE: 1
CONSTITUTIONAL NEGATIVE: 1
CARDIOVASCULAR NEGATIVE: 1

## 2024-11-19 NOTE — PROGRESS NOTES
"Subjective   Reason for Visit: Devante Denise is an 69 y.o. male here for a Medicare Wellness visit.   Discussed depression screen with patient for 5 min   Discussed living will and DNR with patient and spent 16 min doing so. Pts questions and concerns reviewed.      Chronic pain back         HPI    Patient Care Team:  Brandt Saleh DO as PCP - General (Family Medicine)  Brandt Saleh DO as PCP - [facility] Attributed Provider     Review of Systems   Constitutional: Negative.    HENT: Negative.     Respiratory: Negative.     Cardiovascular: Negative.    Genitourinary: Negative.        Objective   Vitals:  /80   Pulse 52   Ht 1.854 m (6' 1\")   Wt 103 kg (226 lb)   SpO2 96%   BMI 29.82 kg/m²       Physical Exam  Constitutional:       Appearance: Normal appearance.   HENT:      Head: Normocephalic.      Mouth/Throat:      Mouth: Mucous membranes are moist.   Eyes:      Extraocular Movements: Extraocular movements intact.      Pupils: Pupils are equal, round, and reactive to light.   Cardiovascular:      Rate and Rhythm: Normal rate and regular rhythm.   Pulmonary:      Effort: Pulmonary effort is normal.   Abdominal:      General: Abdomen is flat.   Musculoskeletal:         General: Normal range of motion.   Neurological:      Mental Status: He is alert.       Assessment & Plan  Routine general medical examination at health care facility    Orders:  •  1 Year Follow Up In Primary Care - Wellness Exam; Future    Nocturia    Orders:  •  PSA; Future    Acute bilateral low back pain without sciatica                   "

## 2025-05-08 DIAGNOSIS — I10 BENIGN ESSENTIAL HTN: Chronic | ICD-10-CM

## 2025-05-08 RX ORDER — ALLOPURINOL 300 MG/1
300 TABLET ORAL DAILY
Qty: 90 TABLET | Refills: 2 | Status: SHIPPED | OUTPATIENT
Start: 2025-05-08

## 2025-06-13 ENCOUNTER — TELEPHONE (OUTPATIENT)
Dept: CARDIOLOGY | Facility: CLINIC | Age: 70
End: 2025-06-13
Payer: MEDICARE

## 2025-06-13 NOTE — TELEPHONE ENCOUNTER
Pt will be scheduled for Colonoscopy.     Clearance is requested.     Can plavix be held for 5 days?    Next Dr Tg VERDUGO scheduled for 9/11/24    Last Dr Tg VERDUGO 9/13/24  1. CAD. Low risk stress test inferoposterior wall ischemia. Catheterization revealed an 80% circumflex marginal branch which was successfully stented. He had a sequential 80% lesion in the RCA and PDA which was staged. He has been doing great.  Continue with aspirin daily.  No symptoms of angina.  EKG today.     2. Hyperlipidemia. Baseline . On atorvastatin 40 mg.  9/10/2024 LDL 63 HDL 54 triglycerides 117 LFTs normal blood sugar 104     3. Thoracic aortic aneurysm. 4.1 cm on a formal CTA 2022..  The CT was repeated 11/13/2023 the aneurysm stable at 4.2 cm. Repeat in 9/2025.      4. Hypertension well-controlled.      CTA 9/2025, FBW 9/2025, RTC 1 year.  EKG

## 2025-07-23 VITALS
RESPIRATION RATE: 9 BRPM | RESPIRATION RATE: 10 BRPM | DIASTOLIC BLOOD PRESSURE: 59 MMHG | SYSTOLIC BLOOD PRESSURE: 102 MMHG | SYSTOLIC BLOOD PRESSURE: 104 MMHG | OXYGEN SATURATION: 97 % | DIASTOLIC BLOOD PRESSURE: 72 MMHG | RESPIRATION RATE: 9 BRPM | SYSTOLIC BLOOD PRESSURE: 139 MMHG | OXYGEN SATURATION: 97 % | RESPIRATION RATE: 11 BRPM | SYSTOLIC BLOOD PRESSURE: 129 MMHG | HEART RATE: 69 BPM | SYSTOLIC BLOOD PRESSURE: 86 MMHG | HEART RATE: 69 BPM | HEART RATE: 73 BPM | DIASTOLIC BLOOD PRESSURE: 68 MMHG | SYSTOLIC BLOOD PRESSURE: 95 MMHG | RESPIRATION RATE: 10 BRPM | DIASTOLIC BLOOD PRESSURE: 90 MMHG | RESPIRATION RATE: 13 BRPM | SYSTOLIC BLOOD PRESSURE: 139 MMHG | OXYGEN SATURATION: 95 % | SYSTOLIC BLOOD PRESSURE: 104 MMHG | SYSTOLIC BLOOD PRESSURE: 89 MMHG | HEART RATE: 63 BPM | HEART RATE: 72 BPM | HEART RATE: 62 BPM | OXYGEN SATURATION: 96 % | RESPIRATION RATE: 14 BRPM | RESPIRATION RATE: 8 BRPM | SYSTOLIC BLOOD PRESSURE: 140 MMHG | OXYGEN SATURATION: 95 % | DIASTOLIC BLOOD PRESSURE: 62 MMHG | RESPIRATION RATE: 12 BRPM | HEART RATE: 56 BPM | DIASTOLIC BLOOD PRESSURE: 90 MMHG | OXYGEN SATURATION: 91 % | OXYGEN SATURATION: 96 % | DIASTOLIC BLOOD PRESSURE: 71 MMHG | OXYGEN SATURATION: 96 % | DIASTOLIC BLOOD PRESSURE: 87 MMHG | SYSTOLIC BLOOD PRESSURE: 90 MMHG | DIASTOLIC BLOOD PRESSURE: 59 MMHG | HEART RATE: 57 BPM | HEART RATE: 55 BPM | DIASTOLIC BLOOD PRESSURE: 60 MMHG | SYSTOLIC BLOOD PRESSURE: 100 MMHG | SYSTOLIC BLOOD PRESSURE: 144 MMHG | SYSTOLIC BLOOD PRESSURE: 102 MMHG | RESPIRATION RATE: 18 BRPM | RESPIRATION RATE: 8 BRPM | SYSTOLIC BLOOD PRESSURE: 86 MMHG | RESPIRATION RATE: 10 BRPM | RESPIRATION RATE: 15 BRPM | SYSTOLIC BLOOD PRESSURE: 95 MMHG | DIASTOLIC BLOOD PRESSURE: 81 MMHG | DIASTOLIC BLOOD PRESSURE: 65 MMHG | RESPIRATION RATE: 13 BRPM | RESPIRATION RATE: 15 BRPM | WEIGHT: 222 LBS | SYSTOLIC BLOOD PRESSURE: 89 MMHG | HEIGHT: 73 IN | OXYGEN SATURATION: 91 % | SYSTOLIC BLOOD PRESSURE: 139 MMHG | HEART RATE: 73 BPM | OXYGEN SATURATION: 91 % | SYSTOLIC BLOOD PRESSURE: 90 MMHG | DIASTOLIC BLOOD PRESSURE: 65 MMHG | RESPIRATION RATE: 13 BRPM | HEART RATE: 56 BPM | RESPIRATION RATE: 14 BRPM | HEART RATE: 74 BPM | RESPIRATION RATE: 11 BRPM | SYSTOLIC BLOOD PRESSURE: 98 MMHG | TEMPERATURE: 974 F | DIASTOLIC BLOOD PRESSURE: 71 MMHG | SYSTOLIC BLOOD PRESSURE: 144 MMHG | SYSTOLIC BLOOD PRESSURE: 130 MMHG | WEIGHT: 222 LBS | DIASTOLIC BLOOD PRESSURE: 87 MMHG | SYSTOLIC BLOOD PRESSURE: 104 MMHG | WEIGHT: 222 LBS | SYSTOLIC BLOOD PRESSURE: 144 MMHG | RESPIRATION RATE: 11 BRPM | DIASTOLIC BLOOD PRESSURE: 59 MMHG | OXYGEN SATURATION: 98 % | DIASTOLIC BLOOD PRESSURE: 72 MMHG | DIASTOLIC BLOOD PRESSURE: 53 MMHG | HEART RATE: 55 BPM | DIASTOLIC BLOOD PRESSURE: 65 MMHG | DIASTOLIC BLOOD PRESSURE: 52 MMHG | DIASTOLIC BLOOD PRESSURE: 66 MMHG | RESPIRATION RATE: 8 BRPM | OXYGEN SATURATION: 95 % | HEIGHT: 73 IN | SYSTOLIC BLOOD PRESSURE: 140 MMHG | DIASTOLIC BLOOD PRESSURE: 53 MMHG | DIASTOLIC BLOOD PRESSURE: 68 MMHG | SYSTOLIC BLOOD PRESSURE: 129 MMHG | OXYGEN SATURATION: 90 % | RESPIRATION RATE: 7 BRPM | DIASTOLIC BLOOD PRESSURE: 56 MMHG | SYSTOLIC BLOOD PRESSURE: 121 MMHG | SYSTOLIC BLOOD PRESSURE: 97 MMHG | RESPIRATION RATE: 7 BRPM | HEART RATE: 60 BPM | DIASTOLIC BLOOD PRESSURE: 52 MMHG | HEART RATE: 60 BPM | SYSTOLIC BLOOD PRESSURE: 130 MMHG | OXYGEN SATURATION: 98 % | HEART RATE: 74 BPM | SYSTOLIC BLOOD PRESSURE: 121 MMHG | SYSTOLIC BLOOD PRESSURE: 102 MMHG | DIASTOLIC BLOOD PRESSURE: 87 MMHG | OXYGEN SATURATION: 90 % | DIASTOLIC BLOOD PRESSURE: 72 MMHG | HEART RATE: 62 BPM | RESPIRATION RATE: 12 BRPM | HEART RATE: 63 BPM | SYSTOLIC BLOOD PRESSURE: 95 MMHG | DIASTOLIC BLOOD PRESSURE: 52 MMHG | SYSTOLIC BLOOD PRESSURE: 89 MMHG | DIASTOLIC BLOOD PRESSURE: 62 MMHG | HEART RATE: 69 BPM | HEART RATE: 73 BPM | HEART RATE: 55 BPM | RESPIRATION RATE: 12 BRPM | OXYGEN SATURATION: 90 % | SYSTOLIC BLOOD PRESSURE: 100 MMHG | RESPIRATION RATE: 15 BRPM | DIASTOLIC BLOOD PRESSURE: 60 MMHG | DIASTOLIC BLOOD PRESSURE: 81 MMHG | OXYGEN SATURATION: 98 % | HEART RATE: 60 BPM | HEIGHT: 73 IN | HEART RATE: 56 BPM | RESPIRATION RATE: 7 BRPM | HEART RATE: 62 BPM | SYSTOLIC BLOOD PRESSURE: 129 MMHG | HEART RATE: 63 BPM | RESPIRATION RATE: 18 BRPM | HEART RATE: 57 BPM | TEMPERATURE: 974 F | SYSTOLIC BLOOD PRESSURE: 98 MMHG | SYSTOLIC BLOOD PRESSURE: 121 MMHG | DIASTOLIC BLOOD PRESSURE: 71 MMHG | HEART RATE: 72 BPM | HEART RATE: 74 BPM | OXYGEN SATURATION: 94 % | RESPIRATION RATE: 9 BRPM | TEMPERATURE: 974 F | SYSTOLIC BLOOD PRESSURE: 97 MMHG | DIASTOLIC BLOOD PRESSURE: 90 MMHG | SYSTOLIC BLOOD PRESSURE: 130 MMHG | OXYGEN SATURATION: 97 % | SYSTOLIC BLOOD PRESSURE: 90 MMHG | SYSTOLIC BLOOD PRESSURE: 86 MMHG | DIASTOLIC BLOOD PRESSURE: 56 MMHG | SYSTOLIC BLOOD PRESSURE: 97 MMHG | HEART RATE: 72 BPM | DIASTOLIC BLOOD PRESSURE: 62 MMHG | RESPIRATION RATE: 18 BRPM | SYSTOLIC BLOOD PRESSURE: 98 MMHG | RESPIRATION RATE: 14 BRPM | SYSTOLIC BLOOD PRESSURE: 140 MMHG | DIASTOLIC BLOOD PRESSURE: 56 MMHG | DIASTOLIC BLOOD PRESSURE: 66 MMHG | OXYGEN SATURATION: 94 % | DIASTOLIC BLOOD PRESSURE: 53 MMHG | SYSTOLIC BLOOD PRESSURE: 100 MMHG | DIASTOLIC BLOOD PRESSURE: 68 MMHG | HEART RATE: 57 BPM | DIASTOLIC BLOOD PRESSURE: 66 MMHG | DIASTOLIC BLOOD PRESSURE: 60 MMHG | DIASTOLIC BLOOD PRESSURE: 81 MMHG | OXYGEN SATURATION: 94 %

## 2025-07-23 PROBLEM — Z86.010 SURVEILLANCE DUE TO PRIOR COLONIC NEOPLASIA: Status: ACTIVE | Noted: 2025-07-24

## 2025-07-23 PROBLEM — Z12.11 SCREENING FOR COLONIC NEOPLASIA: Status: ACTIVE | Noted: 2025-07-24

## 2025-07-24 ENCOUNTER — AMBULATORY SURGICAL CENTER (OUTPATIENT)
Dept: URBAN - METROPOLITAN AREA SURGERY 12 | Facility: SURGERY | Age: 70
End: 2025-07-24
Payer: MEDICARE

## 2025-07-24 ENCOUNTER — OFFICE (OUTPATIENT)
Dept: URBAN - METROPOLITAN AREA PATHOLOGY 2 | Facility: PATHOLOGY | Age: 70
End: 2025-07-24
Payer: MEDICARE

## 2025-07-24 DIAGNOSIS — K63.5 POLYP OF COLON: ICD-10-CM

## 2025-07-24 DIAGNOSIS — Z09 ENCOUNTER FOR FOLLOW-UP EXAMINATION AFTER COMPLETED TREATMEN: ICD-10-CM

## 2025-07-24 DIAGNOSIS — K64.8 OTHER HEMORRHOIDS: ICD-10-CM

## 2025-07-24 DIAGNOSIS — D12.3 BENIGN NEOPLASM OF TRANSVERSE COLON: ICD-10-CM

## 2025-07-24 DIAGNOSIS — K57.30 DIVERTICULOSIS OF LARGE INTESTINE WITHOUT PERFORATION OR ABS: ICD-10-CM

## 2025-07-24 DIAGNOSIS — Z86.0101 PERSONAL HISTORY OF ADENOMATOUS AND SERRATED COLON POLYPS: ICD-10-CM

## 2025-07-24 DIAGNOSIS — D12.2 BENIGN NEOPLASM OF ASCENDING COLON: ICD-10-CM

## 2025-07-24 DIAGNOSIS — K64.4 RESIDUAL HEMORRHOIDAL SKIN TAGS: ICD-10-CM

## 2025-07-24 PROCEDURE — 45385 COLONOSCOPY W/LESION REMOVAL: CPT | Mod: PT | Performed by: INTERNAL MEDICINE

## 2025-07-24 PROCEDURE — 45380 COLONOSCOPY AND BIOPSY: CPT | Mod: 59,PT | Performed by: INTERNAL MEDICINE

## 2025-07-24 PROCEDURE — 88305 TISSUE EXAM BY PATHOLOGIST: CPT | Performed by: PATHOLOGY
